# Patient Record
Sex: MALE | Race: WHITE | NOT HISPANIC OR LATINO | URBAN - METROPOLITAN AREA
[De-identification: names, ages, dates, MRNs, and addresses within clinical notes are randomized per-mention and may not be internally consistent; named-entity substitution may affect disease eponyms.]

---

## 2020-07-27 RX ORDER — POVIDONE-IODINE 5 %
1 AEROSOL (ML) TOPICAL ONCE
Refills: 0 | Status: COMPLETED | OUTPATIENT
Start: 2020-07-28 | End: 2020-07-28

## 2020-07-27 NOTE — H&P ADULT - NSICDXPASTSURGICALHX_GEN_ALL_CORE_FT
PAST SURGICAL HISTORY:  History of cholecystectomy     History of hernia repair     History of hernia repair     History of penile implant     History of prostatectomy

## 2020-07-27 NOTE — H&P ADULT - NSHPPHYSICALEXAM_GEN_ALL_CORE
General: Alert and oriented, NAD  MSK:  Decreased ROM of right knee secondary to pain.    Remainder of PE as per medical clearance General: Alert and oriented, NAD  MSK:  Decreased ROM of right knee secondary to pain.  Skin warm and well perfused, no visible wounds/erythema/ecchymoses  EHL/FHL/TA/GS 5/5 motor strength bilateral lower extremities   SLT in tact to distal bilateral lower extremities   DP/PT pulses 2+     Remainder of PE as per medical clearance

## 2020-07-27 NOTE — H&P ADULT - NSHPLABSRESULTS_GEN_ALL_CORE
Preop cbc, bmp, pt/inr, ptt wnl reviewed by medical clearance.  UA ord dos  COVID swab negative  preop ekg NSR wnl reviewed by medical clearance   Preop echo normal L ventricular EF>65% Preop cbc, bmp, pt/inr, ptt wnl reviewed by medical clearance.  UA dos  COVID swab negative  preop ekg NSR wnl reviewed by medical clearance   Preop echo normal L ventricular EF>65%

## 2020-07-27 NOTE — H&P ADULT - HISTORY OF PRESENT ILLNESS
69yo male co right knee pain x .     Patient presents for elective right total knee arthroplasty. 69yo male co right knee pain x chronic. Pt states he had a prior ligamentous injury approximately 40 years ago which he states he never had fixed surgically. He states his knee pain is localized; he has intermittent numbness/tingling/weakness of bilateral feet / lower extremities. He does not ambulate with an assistive device at baseline. Pt states he has had approximately 4 series of the 3x synvisc intra articular injections - last series was several years ago. Denies DVT hx; denies CP, SOB, N/V, tactile fevers, calf pain.  Pt has failed conservative treatment of his symptoms.     Patient presents for elective right total knee arthroplasty.

## 2020-07-27 NOTE — H&P ADULT - PROBLEM SELECTOR PLAN 1
[Dear  ___] : Dear  [unfilled], [Consult Letter:] : I had the pleasure of evaluating your patient, [unfilled]. [Please see my note below.] : Please see my note below. [( Thank you for referring [unfilled] for consultation for _____ )] : Thank you for referring [unfilled] for consultation for [unfilled] [Consult Closing:] : Thank you very much for allowing me to participate in the care of this patient.  If you have any questions, please do not hesitate to contact me. [FreeTextEntry1] : Pt returns for f/u of hypercalciuria, possible MSK---> doing well at this time, though has felt 'dehydrated' recently.  Taking HCTZ. \par \par They underwent left URS with laser and stone removal, left ureteral and renal calculi on 8/9/19.  Possible MSK appearance.\par \par Stone analysis: 100% Calcium oxalate monohydrate \par previously mixed calcium oxalate\par Pt also notes difficulties with stress incontinence intermittent with cough and sneeze.\par \par Hypercalciuria w/u:  BMP potassium 5.3, eGFR 100, creat 0.7, calcium 9.7, PTH 40\par Fasting urine Ca:cr= 0/16.... indicative of renal leak hypercalciuria.\par \par Initial 24 hour with hypercalciuria both days.- 252, 260, all else excellent including pH and citrate---> f/u 24 hour urine on HCTZ 25 mg with calcium down to 189, but now with low volume < 1 liter and citrate low (likely volumet)\par \par With some types of MSK, starting citrate therapy can help, but pt's citrate excellent and pH more alkaline and excellent without additional therapy- will follow for now.\par \par 1. cont hctz 25 mg (can start with 1/2 dose for several days)\par 2. checking bloodwork next week\par 3. low sodium diet, reasonable potassium diet\par 4. RTC approx 6 months with renal us\par 5. kegel exercises recommended\par \par She will also f/u with you- having some left lower lateral abdominal/abdominal wall pain---> no cva tenderness to suggest kidney cause, but I wanted to mention to you for f/u.\par  [Sincerely,] : Sincerely, Admit to orthopedics.  Presents for elective R TKA  Medically optimized and cleared for surgery by Dr. Tyler

## 2020-07-28 ENCOUNTER — INPATIENT (INPATIENT)
Facility: HOSPITAL | Age: 70
LOS: 0 days | Discharge: HOME | DRG: 470 | End: 2020-07-29
Attending: ORTHOPAEDIC SURGERY | Admitting: ORTHOPAEDIC SURGERY
Payer: COMMERCIAL

## 2020-07-28 VITALS
OXYGEN SATURATION: 98 % | RESPIRATION RATE: 16 BRPM | HEIGHT: 73 IN | WEIGHT: 233.03 LBS | HEART RATE: 74 BPM | SYSTOLIC BLOOD PRESSURE: 154 MMHG | TEMPERATURE: 98 F | DIASTOLIC BLOOD PRESSURE: 78 MMHG

## 2020-07-28 DIAGNOSIS — Z90.49 ACQUIRED ABSENCE OF OTHER SPECIFIED PARTS OF DIGESTIVE TRACT: Chronic | ICD-10-CM

## 2020-07-28 DIAGNOSIS — Z98.890 OTHER SPECIFIED POSTPROCEDURAL STATES: Chronic | ICD-10-CM

## 2020-07-28 DIAGNOSIS — M17.11 UNILATERAL PRIMARY OSTEOARTHRITIS, RIGHT KNEE: ICD-10-CM

## 2020-07-28 DIAGNOSIS — Z96.0 PRESENCE OF UROGENITAL IMPLANTS: Chronic | ICD-10-CM

## 2020-07-28 DIAGNOSIS — Z90.79 ACQUIRED ABSENCE OF OTHER GENITAL ORGAN(S): Chronic | ICD-10-CM

## 2020-07-28 LAB
APPEARANCE UR: CLEAR — SIGNIFICANT CHANGE UP
BILIRUB UR-MCNC: NEGATIVE — SIGNIFICANT CHANGE UP
COLOR SPEC: YELLOW — SIGNIFICANT CHANGE UP
DIFF PNL FLD: NEGATIVE — SIGNIFICANT CHANGE UP
GLUCOSE UR QL: NEGATIVE — SIGNIFICANT CHANGE UP
KETONES UR-MCNC: NEGATIVE — SIGNIFICANT CHANGE UP
LEUKOCYTE ESTERASE UR-ACNC: NEGATIVE — SIGNIFICANT CHANGE UP
NITRITE UR-MCNC: NEGATIVE — SIGNIFICANT CHANGE UP
PH UR: 6 — SIGNIFICANT CHANGE UP (ref 5–8)
PROT UR-MCNC: NEGATIVE MG/DL — SIGNIFICANT CHANGE UP
SP GR SPEC: >=1.03 — SIGNIFICANT CHANGE UP (ref 1–1.03)
UROBILINOGEN FLD QL: 0.2 E.U./DL — SIGNIFICANT CHANGE UP

## 2020-07-28 PROCEDURE — 73560 X-RAY EXAM OF KNEE 1 OR 2: CPT | Mod: 26,RT

## 2020-07-28 PROCEDURE — 99232 SBSQ HOSP IP/OBS MODERATE 35: CPT | Mod: GC

## 2020-07-28 RX ORDER — GABAPENTIN 400 MG/1
100 CAPSULE ORAL EVERY 8 HOURS
Refills: 0 | Status: DISCONTINUED | OUTPATIENT
Start: 2020-07-28 | End: 2020-07-29

## 2020-07-28 RX ORDER — CELECOXIB 200 MG/1
200 CAPSULE ORAL
Refills: 0 | Status: DISCONTINUED | OUTPATIENT
Start: 2020-07-30 | End: 2020-07-29

## 2020-07-28 RX ORDER — BUPIVACAINE 13.3 MG/ML
20 INJECTION, SUSPENSION, LIPOSOMAL INFILTRATION ONCE
Refills: 0 | Status: DISCONTINUED | OUTPATIENT
Start: 2020-07-28 | End: 2020-07-29

## 2020-07-28 RX ORDER — ACETAMINOPHEN 500 MG
650 TABLET ORAL EVERY 6 HOURS
Refills: 0 | Status: DISCONTINUED | OUTPATIENT
Start: 2020-07-28 | End: 2020-07-29

## 2020-07-28 RX ORDER — CHLORHEXIDINE GLUCONATE 213 G/1000ML
1 SOLUTION TOPICAL ONCE
Refills: 0 | Status: COMPLETED | OUTPATIENT
Start: 2020-07-28 | End: 2020-07-28

## 2020-07-28 RX ORDER — GABAPENTIN 400 MG/1
100 CAPSULE ORAL ONCE
Refills: 0 | Status: COMPLETED | OUTPATIENT
Start: 2020-07-28 | End: 2020-07-28

## 2020-07-28 RX ORDER — PANTOPRAZOLE SODIUM 20 MG/1
40 TABLET, DELAYED RELEASE ORAL
Refills: 0 | Status: DISCONTINUED | OUTPATIENT
Start: 2020-07-28 | End: 2020-07-29

## 2020-07-28 RX ORDER — ASPIRIN/CALCIUM CARB/MAGNESIUM 324 MG
81 TABLET ORAL
Refills: 0 | Status: DISCONTINUED | OUTPATIENT
Start: 2020-07-29 | End: 2020-07-29

## 2020-07-28 RX ORDER — MAGNESIUM HYDROXIDE 400 MG/1
30 TABLET, CHEWABLE ORAL DAILY
Refills: 0 | Status: DISCONTINUED | OUTPATIENT
Start: 2020-07-28 | End: 2020-07-29

## 2020-07-28 RX ORDER — CEFAZOLIN SODIUM 1 G
2000 VIAL (EA) INJECTION EVERY 8 HOURS
Refills: 0 | Status: COMPLETED | OUTPATIENT
Start: 2020-07-28 | End: 2020-07-29

## 2020-07-28 RX ORDER — SODIUM CHLORIDE 9 MG/ML
1000 INJECTION, SOLUTION INTRAVENOUS
Refills: 0 | Status: DISCONTINUED | OUTPATIENT
Start: 2020-07-28 | End: 2020-07-29

## 2020-07-28 RX ORDER — ONDANSETRON 8 MG/1
4 TABLET, FILM COATED ORAL EVERY 6 HOURS
Refills: 0 | Status: DISCONTINUED | OUTPATIENT
Start: 2020-07-28 | End: 2020-07-29

## 2020-07-28 RX ORDER — TRAMADOL HYDROCHLORIDE 50 MG/1
50 TABLET ORAL EVERY 6 HOURS
Refills: 0 | Status: DISCONTINUED | OUTPATIENT
Start: 2020-07-28 | End: 2020-07-29

## 2020-07-28 RX ORDER — ACETAMINOPHEN 500 MG
1000 TABLET ORAL ONCE
Refills: 0 | Status: COMPLETED | OUTPATIENT
Start: 2020-07-28 | End: 2020-07-28

## 2020-07-28 RX ORDER — OXYCODONE HYDROCHLORIDE 5 MG/1
5 TABLET ORAL EVERY 4 HOURS
Refills: 0 | Status: DISCONTINUED | OUTPATIENT
Start: 2020-07-28 | End: 2020-07-29

## 2020-07-28 RX ORDER — CELECOXIB 200 MG/1
400 CAPSULE ORAL ONCE
Refills: 0 | Status: COMPLETED | OUTPATIENT
Start: 2020-07-28 | End: 2020-07-28

## 2020-07-28 RX ORDER — SENNA PLUS 8.6 MG/1
2 TABLET ORAL AT BEDTIME
Refills: 0 | Status: DISCONTINUED | OUTPATIENT
Start: 2020-07-28 | End: 2020-07-29

## 2020-07-28 RX ORDER — HYDROMORPHONE HYDROCHLORIDE 2 MG/ML
0.5 INJECTION INTRAMUSCULAR; INTRAVENOUS; SUBCUTANEOUS
Refills: 0 | Status: DISCONTINUED | OUTPATIENT
Start: 2020-07-28 | End: 2020-07-29

## 2020-07-28 RX ORDER — HYDROMORPHONE HYDROCHLORIDE 2 MG/ML
0.5 INJECTION INTRAMUSCULAR; INTRAVENOUS; SUBCUTANEOUS EVERY 4 HOURS
Refills: 0 | Status: DISCONTINUED | OUTPATIENT
Start: 2020-07-28 | End: 2020-07-29

## 2020-07-28 RX ORDER — POLYETHYLENE GLYCOL 3350 17 G/17G
17 POWDER, FOR SOLUTION ORAL DAILY
Refills: 0 | Status: DISCONTINUED | OUTPATIENT
Start: 2020-07-28 | End: 2020-07-29

## 2020-07-28 RX ADMIN — Medication 1000 MILLIGRAM(S): at 07:54

## 2020-07-28 RX ADMIN — Medication 1 APPLICATION(S): at 07:57

## 2020-07-28 RX ADMIN — HYDROMORPHONE HYDROCHLORIDE 0.5 MILLIGRAM(S): 2 INJECTION INTRAMUSCULAR; INTRAVENOUS; SUBCUTANEOUS at 12:38

## 2020-07-28 RX ADMIN — HYDROMORPHONE HYDROCHLORIDE 0.5 MILLIGRAM(S): 2 INJECTION INTRAMUSCULAR; INTRAVENOUS; SUBCUTANEOUS at 13:47

## 2020-07-28 RX ADMIN — HYDROMORPHONE HYDROCHLORIDE 0.5 MILLIGRAM(S): 2 INJECTION INTRAMUSCULAR; INTRAVENOUS; SUBCUTANEOUS at 14:25

## 2020-07-28 RX ADMIN — HYDROMORPHONE HYDROCHLORIDE 0.5 MILLIGRAM(S): 2 INJECTION INTRAMUSCULAR; INTRAVENOUS; SUBCUTANEOUS at 12:53

## 2020-07-28 RX ADMIN — SODIUM CHLORIDE 100 MILLILITER(S): 9 INJECTION, SOLUTION INTRAVENOUS at 14:08

## 2020-07-28 RX ADMIN — Medication 100 MILLIGRAM(S): at 17:51

## 2020-07-28 RX ADMIN — CELECOXIB 400 MILLIGRAM(S): 200 CAPSULE ORAL at 07:55

## 2020-07-28 RX ADMIN — CHLORHEXIDINE GLUCONATE 1 APPLICATION(S): 213 SOLUTION TOPICAL at 07:28

## 2020-07-28 RX ADMIN — GABAPENTIN 100 MILLIGRAM(S): 400 CAPSULE ORAL at 21:45

## 2020-07-28 RX ADMIN — GABAPENTIN 100 MILLIGRAM(S): 400 CAPSULE ORAL at 07:55

## 2020-07-28 NOTE — CONSULT NOTE ADULT - ATTENDING COMMENTS
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 Internal medicine consulted for post-op co-management.     Patient tolerated procedure well. At time of interview, patient was in good spirits, just ate dinner.     #Post-op co-management   Continue encouraging incentive spirometer.   Post-op abx and pain management per primary team   Continue with bowel regimen.    Rest of history and plan per Dr. Linda Koehler' excellent note above.

## 2020-07-28 NOTE — CONSULT NOTE ADULT - ASSESSMENT
***************** INCOMPLETE NOTE ********************      #Post op management  - incentive spirometer  - pain management per primary team  - c/w bowel regimen (Dulcolax, Miralax, senna)  - recommend trending CBC, CMP  - Maintain active T&S 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 Medicine consulted for post op management.             #Post op management  - incentive spirometer  - pain management per primary team  - c/w bowel regimen (Dulcolax, Miralax, senna)  - recommend trending CBC, CMP  - Maintain active T&S

## 2020-07-28 NOTE — PHYSICAL THERAPY INITIAL EVALUATION ADULT - PERTINENT HX OF CURRENT PROBLEM, REHAB EVAL
69yo male co right knee pain x chronic. Pt states he had a prior ligamentous injury approximately 40 years ago which he states he never had fixed surgically. He states his knee pain is localized; he has intermittent numbness/tingling/weakness of bilateral feet / lower extremities. He does not ambulate with an assistive device at baseline.

## 2020-07-28 NOTE — PHYSICAL THERAPY INITIAL EVALUATION ADULT - CRITERIA FOR SKILLED THERAPEUTIC INTERVENTIONS
rehab potential/therapy frequency/impairments found/anticipated equipment needs at discharge/anticipated discharge recommendation/functional limitations in following categories

## 2020-07-28 NOTE — CONSULT NOTE ADULT - SUBJECTIVE AND OBJECTIVE BOX
ERIC SANTIAGO  70y  Male      Patient is a 70y old  Male who presents with a chief complaint of R knee pain (28 Jul 2020 13:17)      INTERVAL HPI/OVERNIGHT EVENTS:        REVIEW OF SYSTEMS:  CONSTITUTIONAL: No fever, weight loss, or fatigue  EYES: No eye pain, visual disturbances, or discharge  ENMT:  No difficulty hearing, tinnitus, vertigo; No sinus or throat pain  NECK: No pain or stiffness  BREASTS: No pain, masses, or nipple discharge  RESPIRATORY: No cough, wheezing, chills or hemoptysis; No shortness of breath  CARDIOVASCULAR: No chest pain, palpitations, dizziness, or leg swelling  GASTROINTESTINAL: No abdominal or epigastric pain. No nausea, vomiting, or hematemesis; No diarrhea or constipation. No melena or hematochezia.  GENITOURINARY: No dysuria, frequency, hematuria, or incontinence  NEUROLOGICAL: No headaches, memory loss, loss of strength, numbness, or tremors  SKIN: No itching, burning, rashes, or lesions   LYMPH NODES: No enlarged glands  ENDOCRINE: No heat or cold intolerance; No hair loss  MUSCULOSKELETAL: No joint pain or swelling; No muscle, back, or extremity pain  PSYCHIATRIC: No depression, anxiety, mood swings, or difficulty sleeping  HEME/LYMPH: No easy bruising, or bleeding gums  ALLERY AND IMMUNOLOGIC: No hives or eczema    T(C): 36.3 (07-28-20 @ 14:38), Max: 36.8 (07-28-20 @ 11:34)  HR: 74 (07-28-20 @ 14:38) (63 - 77)  BP: 141/80 (07-28-20 @ 14:38) (116/71 - 154/78)  RR: 16 (07-28-20 @ 14:38) (12 - 22)  SpO2: 97% (07-28-20 @ 14:38) (97% - 100%)  Wt(kg): --Vital Signs Last 24 Hrs  T(C): 36.3 (28 Jul 2020 14:38), Max: 36.8 (28 Jul 2020 11:34)  T(F): 97.4 (28 Jul 2020 14:38), Max: 98.3 (28 Jul 2020 11:34)  HR: 74 (28 Jul 2020 14:38) (63 - 77)  BP: 141/80 (28 Jul 2020 14:38) (116/71 - 154/78)  BP(mean): 107 (28 Jul 2020 13:24) (86 - 107)  RR: 16 (28 Jul 2020 14:38) (12 - 22)  SpO2: 97% (28 Jul 2020 14:38) (97% - 100%)    PHYSICAL EXAM:  GENERAL: NAD, well-groomed, well-developed  HEAD:  Atraumatic, Normocephalic  EYES: EOMI, PERRLA, conjunctiva and sclera clear  ENMT: No tonsillar erythema, exudates, or enlargement; Moist mucous membranes, Good dentition, No lesions  NECK: Supple, No JVD, Normal thyroid  NERVOUS SYSTEM:  Alert & Oriented X3, Good concentration; Motor Strength 5/5 B/L upper and lower extremities; DTRs 2+ intact and symmetric  CHEST/LUNG: Clear to percussion bilaterally; No rales, rhonchi, wheezing, or rubs  HEART: Regular rate and rhythm; No murmurs, rubs, or gallops  ABDOMEN: Soft, Nontender, Nondistended; Bowel sounds present  EXTREMITIES:  2+ Peripheral Pulses, No clubbing, cyanosis, or edema  LYMPH: No lymphadenopathy noted  SKIN: No rashes or lesions    Consultant(s) Notes Reviewed:  [x ] YES  [ ] NO  Care Discussed with Consultants/Other Providers [ x] YES  [ ] NO    LABS:            Urinalysis Basic - ( 28 Jul 2020 07:58 )    Color: Yellow / Appearance: Clear / SG: >=1.030 / pH: x  Gluc: x / Ketone: NEGATIVE  / Bili: Negative / Urobili: 0.2 E.U./dL   Blood: x / Protein: NEGATIVE mg/dL / Nitrite: NEGATIVE   Leuk Esterase: NEGATIVE / RBC: x / WBC x   Sq Epi: x / Non Sq Epi: x / Bacteria: x      CAPILLARY BLOOD GLUCOSE            Urinalysis Basic - ( 28 Jul 2020 07:58 )    Color: Yellow / Appearance: Clear / SG: >=1.030 / pH: x  Gluc: x / Ketone: NEGATIVE  / Bili: Negative / Urobili: 0.2 E.U./dL   Blood: x / Protein: NEGATIVE mg/dL / Nitrite: NEGATIVE   Leuk Esterase: NEGATIVE / RBC: x / WBC x   Sq Epi: x / Non Sq Epi: x / Bacteria: x        RADIOLOGY & ADDITIONAL TESTS:    Imaging Personally Reviewed:  [ ] YES  [ ] NO ERIC SANTIAGO  70y  Male      Patient is a 70y old  Male who presents with a chief complaint of R knee pain (28 Jul 2020 13:17). Medicine consulted for post-op management.       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 Pt states he had a prior ligamentous injury approximately 40 years ago which he states he never had fixed surgically. He states his knee pain is localized; he has intermittent numbness/tingling/weakness of bilateral feet / lower extremities. He does not ambulate with an assistive device at baseline. Pt states he has had approximately 4 series of the 3x synvisc intra articular injections - last series was several years ago. Does not take any medications at home. Denies DVT hx; denies CP, SOB, N/V, tactile fevers, calf pain, changes in bowel or urinary habits. Medicine consulted for post op management as pt now s/p elective right total knee arthroplasty.    PMH: prostate ca,   Psurg hx: prostatectomy and penile implant, cholecystectomy, hernia repair, L hip replacement  	  Social Hx: Infectious disease physician, , lives with his wife, denies hx of smoking, EtOH, drug use  Meds: does not take home meds    INTERVAL HPI/OVERNIGHT EVENTS:        REVIEW OF SYSTEMS:  CONSTITUTIONAL: No fever, weight loss, or fatigue  EYES: No eye pain, visual disturbances, or discharge  ENMT:  No difficulty hearing, tinnitus, vertigo; No sinus or throat pain  NECK: No pain or stiffness  BREASTS: No pain, masses, or nipple discharge  RESPIRATORY: No cough, wheezing, chills or hemoptysis; No shortness of breath  CARDIOVASCULAR: No chest pain, palpitations, dizziness, or leg swelling  GASTROINTESTINAL: No abdominal or epigastric pain. No nausea, vomiting, or hematemesis; No diarrhea or constipation. No melena or hematochezia.  GENITOURINARY: No dysuria, frequency, hematuria, or incontinence  NEUROLOGICAL: No headaches, memory loss, loss of strength, numbness, or tremors  SKIN: No itching, burning, rashes, or lesions   LYMPH NODES: No enlarged glands  ENDOCRINE: No heat or cold intolerance; No hair loss  MUSCULOSKELETAL: No joint pain or swelling; No muscle, back, or extremity pain  PSYCHIATRIC: No depression, anxiety, mood swings, or difficulty sleeping  HEME/LYMPH: No easy bruising, or bleeding gums  ALLERY AND IMMUNOLOGIC: No hives or eczema    T(C): 36.3 (07-28-20 @ 14:38), Max: 36.8 (07-28-20 @ 11:34)  HR: 74 (07-28-20 @ 14:38) (63 - 77)  BP: 141/80 (07-28-20 @ 14:38) (116/71 - 154/78)  RR: 16 (07-28-20 @ 14:38) (12 - 22)  SpO2: 97% (07-28-20 @ 14:38) (97% - 100%)  Wt(kg): --Vital Signs Last 24 Hrs  T(C): 36.3 (28 Jul 2020 14:38), Max: 36.8 (28 Jul 2020 11:34)  T(F): 97.4 (28 Jul 2020 14:38), Max: 98.3 (28 Jul 2020 11:34)  HR: 74 (28 Jul 2020 14:38) (63 - 77)  BP: 141/80 (28 Jul 2020 14:38) (116/71 - 154/78)  BP(mean): 107 (28 Jul 2020 13:24) (86 - 107)  RR: 16 (28 Jul 2020 14:38) (12 - 22)  SpO2: 97% (28 Jul 2020 14:38) (97% - 100%)    PHYSICAL EXAM:  GENERAL: pleasant, NAD, well-groomed, well-developed  HEAD:  Atraumatic, Normocephalic  EYES: EOMI, PERRLA, conjunctiva and sclera clear  ENMT: No tonsillar erythema, exudates, or enlargement; Moist mucous membranes, Good dentition, No lesions  NECK: Supple, No JVD, Normal thyroid  NERVOUS SYSTEM:  Alert & Oriented X3, Good concentration; Motor Strength 5/5 B/L upper and lower extremities; DTRs 2+ intact and symmetric  CHEST/LUNG: Clear to percussion bilaterally; No rales, rhonchi, wheezing, or rubs  HEART: Regular rate and rhythm; No murmurs, rubs, or gallops  ABDOMEN: Soft, Nontender, Nondistended; Bowel sounds present  EXTREMITIES:  RLE 1+ edema, 2+ Peripheral Pulses, No clubbing, cyanosis, or edema  LYMPH: No lymphadenopathy noted  SKIN: No rashes or lesions    Consultant(s) Notes Reviewed:  [x ] YES  [ ] NO  Care Discussed with Consultants/Other Providers [ x] YES  [ ] NO    LABS:            Urinalysis Basic - ( 28 Jul 2020 07:58 )    Color: Yellow / Appearance: Clear / SG: >=1.030 / pH: x  Gluc: x / Ketone: NEGATIVE  / Bili: Negative / Urobili: 0.2 E.U./dL   Blood: x / Protein: NEGATIVE mg/dL / Nitrite: NEGATIVE   Leuk Esterase: NEGATIVE / RBC: x / WBC x   Sq Epi: x / Non Sq Epi: x / Bacteria: x      CAPILLARY BLOOD GLUCOSE            Urinalysis Basic - ( 28 Jul 2020 07:58 )    Color: Yellow / Appearance: Clear / SG: >=1.030 / pH: x  Gluc: x / Ketone: NEGATIVE  / Bili: Negative / Urobili: 0.2 E.U./dL   Blood: x / Protein: NEGATIVE mg/dL / Nitrite: NEGATIVE   Leuk Esterase: NEGATIVE / RBC: x / WBC x   Sq Epi: x / Non Sq Epi: x / Bacteria: x        RADIOLOGY & ADDITIONAL TESTS: Reviewed    Imaging Personally Reviewed:  [ ] YES  [ ] NO

## 2020-07-28 NOTE — PHYSICAL THERAPY INITIAL EVALUATION ADULT - GENERAL OBSERVATIONS, REHAB EVAL
Received supine complaints of R knee pain 1/10 +R cryocuff, B SCD, IV hep. Left as found +RN Mary aware, call concepcion

## 2020-07-28 NOTE — PHYSICAL THERAPY INITIAL EVALUATION ADULT - ACTIVE RANGE OF MOTION EXAMINATION, REHAB EVAL
R knee 0-90/LLE Active ROM was WNL (within normal limits)/bilateral upper extremity Active ROM was WFL (within functional limits)

## 2020-07-28 NOTE — PROGRESS NOTE ADULT - SUBJECTIVE AND OBJECTIVE BOX
Orthopaedics Post Op Check    Procedure: right total knee arthroplasty  Surgeon: Dr. Azevedo     Pt comfortable, without complaints  Denies CP, SOB, N/V, numbness/tingling     Vital Signs Last 24 Hrs  T(C): 36.8 (28 Jul 2020 11:34), Max: 36.8 (28 Jul 2020 11:34)  T(F): 98.3 (28 Jul 2020 11:34), Max: 98.3 (28 Jul 2020 11:34)  HR: 63 (28 Jul 2020 12:54) (63 - 77)  BP: 138/78 (28 Jul 2020 12:54) (116/71 - 154/78)  BP(mean): 102 (28 Jul 2020 12:54) (86 - 102)  RR: 13 (28 Jul 2020 12:54) (13 - 22)  SpO2: 100% (28 Jul 2020 12:54) (97% - 100%)  AVSS, NAD    Dressing C/D/I  General: Pt Alert and oriented     Pulses: DP pulses 2+ bilaterally   Sensation: SLT in tact to distal bilateral lower extremities   Motor: EHL/FHL/TA/GS 5/5 motor strength bilaterally           Post op XR: right knee prosthesis in place     A/P: 70yMale POD#0 s/p right TKR   - Stable  - Pain Control  - DVT ppx: ASA   - Post op abx: Ancef  - PT, WBS: WBAT  - F/U AM Labs

## 2020-07-28 NOTE — PHYSICAL THERAPY INITIAL EVALUATION ADULT - GAIT DEVIATIONS NOTED, PT EVAL
decreased jayson/increased time in double stance/decreased weight-shifting ability/decreased step length

## 2020-07-28 NOTE — PHYSICAL THERAPY INITIAL EVALUATION ADULT - ADDITIONAL COMMENTS
has straight cane, independent prior to arrival, house, 4 steps to enter, 1 flight inside, B handrail however wide, no falls reported in last year

## 2020-07-29 VITALS
SYSTOLIC BLOOD PRESSURE: 116 MMHG | RESPIRATION RATE: 16 BRPM | HEART RATE: 68 BPM | TEMPERATURE: 97 F | DIASTOLIC BLOOD PRESSURE: 68 MMHG | OXYGEN SATURATION: 98 %

## 2020-07-29 LAB
ANION GAP SERPL CALC-SCNC: 8 MMOL/L — SIGNIFICANT CHANGE UP (ref 5–17)
BUN SERPL-MCNC: 25 MG/DL — HIGH (ref 7–23)
CALCIUM SERPL-MCNC: 8.5 MG/DL — SIGNIFICANT CHANGE UP (ref 8.4–10.5)
CHLORIDE SERPL-SCNC: 106 MMOL/L — SIGNIFICANT CHANGE UP (ref 96–108)
CO2 SERPL-SCNC: 27 MMOL/L — SIGNIFICANT CHANGE UP (ref 22–31)
CREAT SERPL-MCNC: 0.83 MG/DL — SIGNIFICANT CHANGE UP (ref 0.5–1.3)
GLUCOSE SERPL-MCNC: 121 MG/DL — HIGH (ref 70–99)
HCT VFR BLD CALC: 36.4 % — LOW (ref 39–50)
HCV AB S/CO SERPL IA: 0.09 S/CO — SIGNIFICANT CHANGE UP
HCV AB SERPL-IMP: SIGNIFICANT CHANGE UP
HGB BLD-MCNC: 12 G/DL — LOW (ref 13–17)
MCHC RBC-ENTMCNC: 28.8 PG — SIGNIFICANT CHANGE UP (ref 27–34)
MCHC RBC-ENTMCNC: 33 GM/DL — SIGNIFICANT CHANGE UP (ref 32–36)
MCV RBC AUTO: 87.3 FL — SIGNIFICANT CHANGE UP (ref 80–100)
NRBC # BLD: 0 /100 WBCS — SIGNIFICANT CHANGE UP (ref 0–0)
PLATELET # BLD AUTO: 185 K/UL — SIGNIFICANT CHANGE UP (ref 150–400)
POTASSIUM SERPL-MCNC: 4.4 MMOL/L — SIGNIFICANT CHANGE UP (ref 3.5–5.3)
POTASSIUM SERPL-SCNC: 4.4 MMOL/L — SIGNIFICANT CHANGE UP (ref 3.5–5.3)
RBC # BLD: 4.17 M/UL — LOW (ref 4.2–5.8)
RBC # FLD: 13.4 % — SIGNIFICANT CHANGE UP (ref 10.3–14.5)
SODIUM SERPL-SCNC: 141 MMOL/L — SIGNIFICANT CHANGE UP (ref 135–145)
WBC # BLD: 13.02 K/UL — HIGH (ref 3.8–10.5)
WBC # FLD AUTO: 13.02 K/UL — HIGH (ref 3.8–10.5)

## 2020-07-29 PROCEDURE — 99232 SBSQ HOSP IP/OBS MODERATE 35: CPT

## 2020-07-29 RX ORDER — TRAMADOL HYDROCHLORIDE 50 MG/1
1 TABLET ORAL
Qty: 28 | Refills: 0
Start: 2020-07-29 | End: 2020-08-04

## 2020-07-29 RX ORDER — GABAPENTIN 400 MG/1
1 CAPSULE ORAL
Qty: 42 | Refills: 0
Start: 2020-07-29 | End: 2020-08-11

## 2020-07-29 RX ORDER — CELECOXIB 200 MG/1
1 CAPSULE ORAL
Qty: 28 | Refills: 0
Start: 2020-07-29 | End: 2020-08-11

## 2020-07-29 RX ORDER — OXYCODONE HYDROCHLORIDE 5 MG/1
1 TABLET ORAL
Qty: 42 | Refills: 0
Start: 2020-07-29 | End: 2020-08-04

## 2020-07-29 RX ORDER — ASPIRIN/CALCIUM CARB/MAGNESIUM 324 MG
1 TABLET ORAL
Qty: 60 | Refills: 0
Start: 2020-07-29 | End: 2020-08-27

## 2020-07-29 RX ORDER — DOCUSATE SODIUM 100 MG
1 CAPSULE ORAL
Qty: 60 | Refills: 0
Start: 2020-07-29 | End: 2020-08-27

## 2020-07-29 RX ORDER — ACETAMINOPHEN 500 MG
2 TABLET ORAL
Qty: 112 | Refills: 0
Start: 2020-07-29 | End: 2020-08-11

## 2020-07-29 RX ORDER — PANTOPRAZOLE SODIUM 20 MG/1
1 TABLET, DELAYED RELEASE ORAL
Qty: 28 | Refills: 0
Start: 2020-07-29 | End: 2020-08-25

## 2020-07-29 RX ADMIN — Medication 81 MILLIGRAM(S): at 05:32

## 2020-07-29 RX ADMIN — Medication 650 MILLIGRAM(S): at 05:32

## 2020-07-29 RX ADMIN — Medication 650 MILLIGRAM(S): at 10:55

## 2020-07-29 RX ADMIN — GABAPENTIN 100 MILLIGRAM(S): 400 CAPSULE ORAL at 05:32

## 2020-07-29 RX ADMIN — Medication 100 MILLIGRAM(S): at 00:22

## 2020-07-29 RX ADMIN — Medication 650 MILLIGRAM(S): at 06:32

## 2020-07-29 RX ADMIN — PANTOPRAZOLE SODIUM 40 MILLIGRAM(S): 20 TABLET, DELAYED RELEASE ORAL at 05:32

## 2020-07-29 RX ADMIN — Medication 650 MILLIGRAM(S): at 10:56

## 2020-07-29 NOTE — PROGRESS NOTE ADULT - SUBJECTIVE AND OBJECTIVE BOX
Patient is a 70y old  Male who presents with a chief complaint of R knee pain (29 Jul 2020 13:52)      INTERVAL HPI/OVERNIGHT EVENTS:  Seen by me this morning, eager and ready to go home. Sitting on high chair. Pain under control. Tolerating PO. No complaints.    Review of Systems: 12 point review of systems otherwise negative    MEDICATIONS  (STANDING):  aspirin enteric coated 81 milliGRAM(s) Oral two times a day  BUpivacaine liposome 1.3% Injectable (no eMAR) 20 milliLiter(s) Local Injection once  gabapentin 100 milliGRAM(s) Oral every 8 hours  lactated ringers. 1000 milliLiter(s) (100 mL/Hr) IV Continuous <Continuous>  pantoprazole    Tablet 40 milliGRAM(s) Oral before breakfast  polyethylene glycol 3350 17 Gram(s) Oral daily    MEDICATIONS  (PRN):  acetaminophen   Tablet .. 650 milliGRAM(s) Oral every 6 hours PRN Temp greater or equal to 38C (100.4F), Mild Pain (1 - 3)  aluminum hydroxide/magnesium hydroxide/simethicone Suspension 30 milliLiter(s) Oral four times a day PRN Indigestion  HYDROmorphone  Injectable 0.5 milliGRAM(s) IV Push every 30 minutes PRN BREAKTHROUGH PAIN PACU ONLY  HYDROmorphone  Injectable 0.5 milliGRAM(s) IV Push every 4 hours PRN Breakthrough Pain Only  magnesium hydroxide Suspension 30 milliLiter(s) Oral daily PRN Constipation  ondansetron Injectable 4 milliGRAM(s) IV Push every 6 hours PRN Nausea and/or Vomiting  oxyCODONE    IR 5 milliGRAM(s) Oral every 4 hours PRN Severe Pain (7 - 10)  senna 2 Tablet(s) Oral at bedtime PRN Constipation  traMADol 50 milliGRAM(s) Oral every 6 hours PRN Moderate Pain (4 - 6)      Allergies    No Known Allergies    Intolerances          Vital Signs Last 24 Hrs  T(C): 36.1 (29 Jul 2020 08:35), Max: 36.7 (28 Jul 2020 20:32)  T(F): 97 (29 Jul 2020 08:35), Max: 98.1 (28 Jul 2020 20:32)  HR: 68 (29 Jul 2020 08:35) (68 - 83)  BP: 116/68 (29 Jul 2020 08:35) (109/64 - 119/66)  BP(mean): --  RR: 16 (29 Jul 2020 08:35) (16 - 17)  SpO2: 98% (29 Jul 2020 08:35) (95% - 98%)  CAPILLARY BLOOD GLUCOSE          07-28 @ 07:01  -  07-29 @ 07:00  --------------------------------------------------------  IN: 300 mL / OUT: 490 mL / NET: -190 mL        Physical Exam:    Daily     Daily   General:  Well appearing, NAD, not cachetic  HEENT:  Nonicteric, PERRLA  CV:  RRR, no murmur, no JVD  Lungs:  CTA B/L, no wheezes, rales, rhonchi  Abdomen:  Soft, non-tender, no distended, positive BS, no hepatosplenomegaly  Extremities: R knee with dressing/wrapping on place, looks edematous  Skin:  Warm and dry, no rashes  :  No mckeon  Neuro:  AAOx3, non-focal, CN II-XII grossly intact  No Restraints    LABS:                        12.0   13.02 )-----------( 185      ( 29 Jul 2020 06:45 )             36.4     07-29    141  |  106  |  25<H>  ----------------------------<  121<H>  4.4   |  27  |  0.83    Ca    8.5      29 Jul 2020 06:45        Urinalysis Basic - ( 28 Jul 2020 07:58 )    Color: Yellow / Appearance: Clear / SG: >=1.030 / pH: x  Gluc: x / Ketone: NEGATIVE  / Bili: Negative / Urobili: 0.2 E.U./dL   Blood: x / Protein: NEGATIVE mg/dL / Nitrite: NEGATIVE   Leuk Esterase: NEGATIVE / RBC: x / WBC x   Sq Epi: x / Non Sq Epi: x / Bacteria: x          RADIOLOGY & ADDITIONAL TESTS:    --------------------

## 2020-07-29 NOTE — DISCHARGE NOTE PROVIDER - NSDCMRMEDTOKEN_GEN_ALL_CORE_FT
Aspirin Enteric Coated 81 mg oral delayed release tablet: 1 tab(s) orally 2 times a day   celecoxib 200 mg oral capsule: 1 cap(s) orally 2 times a day  Colace 100 mg oral capsule: 1 cap(s) orally 2 times a day, As Needed for constipation  gabapentin 100 mg oral capsule: 1 cap(s) orally 3 times a day   Lupron Depot-Gyn 11.25 mg/3 months intramuscular injection, extended release: 1  intramuscular every three months  oxyCODONE 5 mg oral tablet: 1 tab(s) orally every 4 hours, As Needed for severe pain MDD:6   Protonix 40 mg oral delayed release tablet: 1 tab(s) orally once a day (before a meal)   traMADol 50 mg oral tablet: 1 tab(s) orally every 6 hours, As Needed for moderate pain MDD:4  Tylenol 325 mg oral tablet: 2 tab(s) orally every 6 hours

## 2020-07-29 NOTE — DISCHARGE NOTE PROVIDER - CARE PROVIDER_API CALL
Benjie Azevedo  ORTHOPAEDIC SURGERY  [address] [place] [address]  Phone: [phone]  Fax: [phone]  Follow Up Time: 2 weeks

## 2020-07-29 NOTE — PROGRESS NOTE ADULT - SUBJECTIVE AND OBJECTIVE BOX
POST OPERATIVE DAY #: 1  STATUS POST: Right  TKA                        SUBJECTIVE: Patient seen and examined. Pt. states he is doing well, and would like to go home today.   Denies any sob/cp/n/v/numbness or tingling in b/l les.     OBJECTIVE:     Vital Signs Last 24 Hrs  T(C): 36.1 (29 Jul 2020 08:35), Max: 36.7 (28 Jul 2020 20:32)  T(F): 97 (29 Jul 2020 08:35), Max: 98.1 (28 Jul 2020 20:32)  HR: 68 (29 Jul 2020 08:35) (68 - 83)  BP: 116/68 (29 Jul 2020 08:35) (109/64 - 141/80)  BP(mean): --  RR: 16 (29 Jul 2020 08:35) (16 - 17)  SpO2: 98% (29 Jul 2020 08:35) (95% - 98%)    Affected extremity: right le          Dressing: clean/dry/intact aquacell         Sensation: intact to light touch to patient's baseline         Motor exam: EHL/TA/GS 5/5  Pulses 2+             I&O's Detail    28 Jul 2020 07:01  -  29 Jul 2020 07:00  --------------------------------------------------------  IN:    lactated ringers.: 300 mL  Total IN: 300 mL    OUT:    Voided: 490 mL  Total OUT: 490 mL    Total NET: -190 mL          LABS:                        12.0   13.02 )-----------( 185      ( 29 Jul 2020 06:45 )             36.4     07-29    141  |  106  |  25<H>  ----------------------------<  121<H>  4.4   |  27  |  0.83    Ca    8.5      29 Jul 2020 06:45        Urinalysis Basic - ( 28 Jul 2020 07:58 )    Color: Yellow / Appearance: Clear / SG: >=1.030 / pH: x  Gluc: x / Ketone: NEGATIVE  / Bili: Negative / Urobili: 0.2 E.U./dL   Blood: x / Protein: NEGATIVE mg/dL / Nitrite: NEGATIVE   Leuk Esterase: NEGATIVE / RBC: x / WBC x   Sq Epi: x / Non Sq Epi: x / Bacteria: x        MEDICATIONS:    acetaminophen   Tablet .. 650 milliGRAM(s) Oral every 6 hours PRN  gabapentin 100 milliGRAM(s) Oral every 8 hours  HYDROmorphone  Injectable 0.5 milliGRAM(s) IV Push every 30 minutes PRN  HYDROmorphone  Injectable 0.5 milliGRAM(s) IV Push every 4 hours PRN  ondansetron Injectable 4 milliGRAM(s) IV Push every 6 hours PRN  oxyCODONE    IR 5 milliGRAM(s) Oral every 4 hours PRN  traMADol 50 milliGRAM(s) Oral every 6 hours PRN    aspirin enteric coated 81 milliGRAM(s) Oral two times a day        ASSESSMENT AND PLAN: 71yo Male s/p right tkr     1. Analgesic pain control  2. DVT prophylaxis: ASA        3. Weight Bearing Status:  Weight bearing as tolerated         4. Disposition: Home today

## 2020-07-29 NOTE — DISCHARGE NOTE PROVIDER - HOSPITAL COURSE
Admitted    Surgery - right total knee replacement    Iraida-op Antibiotics    Pain control    DVT prophylaxis    OOB/Physical Therapy

## 2020-07-29 NOTE — DISCHARGE NOTE PROVIDER - NSDCCPCAREPLAN_GEN_ALL_CORE_FT
PRINCIPAL DISCHARGE DIAGNOSIS  Diagnosis: Osteoarthritis of right knee  Assessment and Plan of Treatment: Osteoarthritis of right knee

## 2020-07-29 NOTE — PROGRESS NOTE ADULT - SUBJECTIVE AND OBJECTIVE BOX
S: Patient seen and examined. Pt. doing well, Pain endorsed but controlled. No acute events overnight.    Vital Signs Last 24 Hrs  T(C): 36.4 (29 Jul 2020 05:05), Max: 36.8 (28 Jul 2020 11:34)  T(F): 97.6 (29 Jul 2020 05:05), Max: 98.3 (28 Jul 2020 11:34)  HR: 70 (29 Jul 2020 05:05) (63 - 83)  BP: 119/66 (29 Jul 2020 05:05) (109/64 - 147/89)  BP(mean): 107 (28 Jul 2020 13:24) (86 - 107)  RR: 17 (29 Jul 2020 05:05) (12 - 22)  SpO2: 97% (29 Jul 2020 05:05) (95% - 100%)    NAD, AOx3, comfortable  Motor: 5/5 GS/TA/EHL/FHL    Sensation: SILT   Pulses: WWP, DP/PT 2+    Dressing: C/D/I                          12.0   13.02 )-----------( 185      ( 29 Jul 2020 06:45 )             36.4         A/P:  70y Male s/p R TKA on 7/28      -Stable  -Pain Control  -PT/WBAT  -DVT ppx: ASA  -Advance diet as tolerated  -f/u AM labs  -Dispo: HPT

## 2020-07-29 NOTE — DISCHARGE NOTE NURSING/CASE MANAGEMENT/SOCIAL WORK - PATIENT PORTAL LINK FT
You can access the FollowMyHealth Patient Portal offered by [facility] by registering at the following website: [email or web address]. By joining [employer]’s FollowMyHealth portal, you will also be able to view your health information using other applications (apps) compatible with our system.

## 2020-07-29 NOTE — PROGRESS NOTE ADULT - ASSESSMENT
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 Medicine consulted for post op management.     #Post op management  - incentive spirometer  - pain management per primary team  - c/w bowel regimen (Dulcolax, Miralax, senna)  Slight drop in Hg/post op anemia  Leukocytosis reactive-post op  PT --> outpatient PT.  DVT PPx w/ ASA BID    D/w Orthopedics. Medically stable from medicine perspective to go home today.

## 2020-07-31 DIAGNOSIS — M17.11 UNILATERAL PRIMARY OSTEOARTHRITIS, RIGHT KNEE: ICD-10-CM

## 2020-07-31 DIAGNOSIS — Z90.79 ACQUIRED ABSENCE OF OTHER GENITAL ORGAN(S): ICD-10-CM

## 2020-07-31 DIAGNOSIS — Z85.46 PERSONAL HISTORY OF MALIGNANT NEOPLASM OF PROSTATE: ICD-10-CM

## 2020-07-31 DIAGNOSIS — Z96.642 PRESENCE OF LEFT ARTIFICIAL HIP JOINT: ICD-10-CM

## 2020-07-31 DIAGNOSIS — Z90.49 ACQUIRED ABSENCE OF OTHER SPECIFIED PARTS OF DIGESTIVE TRACT: ICD-10-CM

## 2020-07-31 DIAGNOSIS — D64.9 ANEMIA, UNSPECIFIED: ICD-10-CM

## 2020-07-31 PROCEDURE — 97161 PT EVAL LOW COMPLEX 20 MIN: CPT

## 2020-07-31 PROCEDURE — 86803 HEPATITIS C AB TEST: CPT

## 2020-07-31 PROCEDURE — 27447 TOTAL KNEE ARTHROPLASTY: CPT

## 2020-07-31 PROCEDURE — 80048 BASIC METABOLIC PNL TOTAL CA: CPT

## 2020-07-31 PROCEDURE — 97116 GAIT TRAINING THERAPY: CPT

## 2020-07-31 PROCEDURE — 85027 COMPLETE CBC AUTOMATED: CPT

## 2020-07-31 PROCEDURE — C1889: CPT

## 2020-07-31 PROCEDURE — S2900: CPT

## 2020-07-31 PROCEDURE — 97110 THERAPEUTIC EXERCISES: CPT

## 2020-07-31 PROCEDURE — C1776: CPT

## 2020-07-31 PROCEDURE — 36415 COLL VENOUS BLD VENIPUNCTURE: CPT

## 2020-07-31 PROCEDURE — 81003 URINALYSIS AUTO W/O SCOPE: CPT

## 2020-07-31 PROCEDURE — 73560 X-RAY EXAM OF KNEE 1 OR 2: CPT

## 2020-08-04 DIAGNOSIS — E66.9 OBESITY, UNSPECIFIED: ICD-10-CM

## 2022-06-10 NOTE — PATIENT PROFILE ADULT - NSPROEDAREADYLEARN_GEN_A_NUR
Visit Information    Have you changed or started any medications since your last visit including any over-the-counter medicines, vitamins, or herbal medicines? no   Have you stopped taking any of your medications? Is so, why? -  no  Are you having any side effects from any of your medications? - no    Have you seen any other physician or provider since your last visit?  no   Have you had any other diagnostic tests since your last visit?  no   Have you been seen in the emergency room and/or had an admission in a hospital since we last saw you?  no   Have you had your routine dental cleaning in the past 6 months?  no     Do you have an active MyChart account? If no, what is the barrier?   No:     Patient Care Team:  Moon Peralta MD as PCP - General (Family Medicine)    Medical History Review  Past Medical, Family, and Social History reviewed and does not contribute to the patient presenting condition    Health Maintenance   Topic Date Due    COVID-19 Vaccine (1) Never done    Pneumococcal 0-64 years Vaccine (1 - PCV) Never done    HIV screen  Never done    Hepatitis C screen  Never done    DTaP/Tdap/Td vaccine (1 - Tdap) Never done    Colorectal Cancer Screen  Never done    Shingles vaccine (1 of 2) Never done    Breast cancer screen  11/22/2018    Flu vaccine (Season Ended) 09/01/2022    Depression Monitoring  05/06/2023    Lipids  04/28/2027    Hepatitis A vaccine  Aged Out    Hepatitis B vaccine  Aged Out    Hib vaccine  Aged Out    Meningococcal (ACWY) vaccine  Aged Out none
